# Patient Record
Sex: MALE | Race: OTHER | HISPANIC OR LATINO | Employment: FULL TIME | URBAN - METROPOLITAN AREA
[De-identification: names, ages, dates, MRNs, and addresses within clinical notes are randomized per-mention and may not be internally consistent; named-entity substitution may affect disease eponyms.]

---
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
## 2022-02-18 ENCOUNTER — APPOINTMENT (OUTPATIENT)
Dept: RADIOLOGY | Facility: MEDICAL CENTER | Age: 22
End: 2022-02-18
Attending: EMERGENCY MEDICINE
Payer: MEDICAID

## 2022-02-18 ENCOUNTER — HOSPITAL ENCOUNTER (EMERGENCY)
Facility: MEDICAL CENTER | Age: 22
End: 2022-02-18
Attending: EMERGENCY MEDICINE
Payer: MEDICAID

## 2022-02-18 VITALS
HEIGHT: 65 IN | RESPIRATION RATE: 20 BRPM | TEMPERATURE: 97.5 F | DIASTOLIC BLOOD PRESSURE: 55 MMHG | OXYGEN SATURATION: 98 % | BODY MASS INDEX: 19.16 KG/M2 | SYSTOLIC BLOOD PRESSURE: 101 MMHG | WEIGHT: 115 LBS | HEART RATE: 65 BPM

## 2022-02-18 DIAGNOSIS — Z56.6 STRESS AT WORK: ICD-10-CM

## 2022-02-18 DIAGNOSIS — E86.0 DEHYDRATION: ICD-10-CM

## 2022-02-18 DIAGNOSIS — R55 NEAR SYNCOPE: ICD-10-CM

## 2022-02-18 LAB
ALBUMIN SERPL BCP-MCNC: 4.9 G/DL (ref 3.2–4.9)
ALBUMIN/GLOB SERPL: 2.1 G/DL
ALP SERPL-CCNC: 126 U/L (ref 30–99)
ALT SERPL-CCNC: 13 U/L (ref 2–50)
ANION GAP SERPL CALC-SCNC: 18 MMOL/L (ref 7–16)
AST SERPL-CCNC: 26 U/L (ref 12–45)
BASOPHILS # BLD AUTO: 0.6 % (ref 0–1.8)
BASOPHILS # BLD: 0.05 K/UL (ref 0–0.12)
BILIRUB SERPL-MCNC: 0.8 MG/DL (ref 0.1–1.5)
BUN SERPL-MCNC: 13 MG/DL (ref 8–22)
CALCIUM SERPL-MCNC: 9.4 MG/DL (ref 8.5–10.5)
CHLORIDE SERPL-SCNC: 104 MMOL/L (ref 96–112)
CO2 SERPL-SCNC: 18 MMOL/L (ref 20–33)
CREAT SERPL-MCNC: 0.77 MG/DL (ref 0.5–1.4)
EKG IMPRESSION: NORMAL
EOSINOPHIL # BLD AUTO: 0.11 K/UL (ref 0–0.51)
EOSINOPHIL NFR BLD: 1.4 % (ref 0–6.9)
ERYTHROCYTE [DISTWIDTH] IN BLOOD BY AUTOMATED COUNT: 41.3 FL (ref 35.9–50)
GLOBULIN SER CALC-MCNC: 2.3 G/DL (ref 1.9–3.5)
GLUCOSE SERPL-MCNC: 93 MG/DL (ref 65–99)
HCT VFR BLD AUTO: 45.3 % (ref 42–52)
HGB BLD-MCNC: 15.7 G/DL (ref 14–18)
IMM GRANULOCYTES # BLD AUTO: 0.05 K/UL (ref 0–0.11)
IMM GRANULOCYTES NFR BLD AUTO: 0.6 % (ref 0–0.9)
LYMPHOCYTES # BLD AUTO: 1.99 K/UL (ref 1–4.8)
LYMPHOCYTES NFR BLD: 24.4 % (ref 22–41)
MAGNESIUM SERPL-MCNC: 2 MG/DL (ref 1.5–2.5)
MCH RBC QN AUTO: 30.3 PG (ref 27–33)
MCHC RBC AUTO-ENTMCNC: 34.7 G/DL (ref 33.7–35.3)
MCV RBC AUTO: 87.5 FL (ref 81.4–97.8)
MONOCYTES # BLD AUTO: 0.38 K/UL (ref 0–0.85)
MONOCYTES NFR BLD AUTO: 4.7 % (ref 0–13.4)
NEUTROPHILS # BLD AUTO: 5.56 K/UL (ref 1.82–7.42)
NEUTROPHILS NFR BLD: 68.3 % (ref 44–72)
NRBC # BLD AUTO: 0 K/UL
NRBC BLD-RTO: 0 /100 WBC
PLATELET # BLD AUTO: 242 K/UL (ref 164–446)
PMV BLD AUTO: 10.2 FL (ref 9–12.9)
POTASSIUM SERPL-SCNC: 3.7 MMOL/L (ref 3.6–5.5)
PROT SERPL-MCNC: 7.2 G/DL (ref 6–8.2)
RBC # BLD AUTO: 5.18 M/UL (ref 4.7–6.1)
SODIUM SERPL-SCNC: 140 MMOL/L (ref 135–145)
WBC # BLD AUTO: 8.1 K/UL (ref 4.8–10.8)

## 2022-02-18 PROCEDURE — 700111 HCHG RX REV CODE 636 W/ 250 OVERRIDE (IP): Performed by: EMERGENCY MEDICINE

## 2022-02-18 PROCEDURE — 71045 X-RAY EXAM CHEST 1 VIEW: CPT

## 2022-02-18 PROCEDURE — 96374 THER/PROPH/DIAG INJ IV PUSH: CPT

## 2022-02-18 PROCEDURE — 83735 ASSAY OF MAGNESIUM: CPT

## 2022-02-18 PROCEDURE — 80053 COMPREHEN METABOLIC PANEL: CPT

## 2022-02-18 PROCEDURE — 99284 EMERGENCY DEPT VISIT MOD MDM: CPT

## 2022-02-18 PROCEDURE — 93005 ELECTROCARDIOGRAM TRACING: CPT | Performed by: EMERGENCY MEDICINE

## 2022-02-18 PROCEDURE — 700105 HCHG RX REV CODE 258: Performed by: EMERGENCY MEDICINE

## 2022-02-18 PROCEDURE — 85025 COMPLETE CBC W/AUTO DIFF WBC: CPT

## 2022-02-18 RX ORDER — LORAZEPAM 2 MG/ML
0.5 INJECTION INTRAMUSCULAR ONCE
Status: COMPLETED | OUTPATIENT
Start: 2022-02-18 | End: 2022-02-18

## 2022-02-18 RX ORDER — SODIUM CHLORIDE, SODIUM LACTATE, POTASSIUM CHLORIDE, CALCIUM CHLORIDE 600; 310; 30; 20 MG/100ML; MG/100ML; MG/100ML; MG/100ML
1000 INJECTION, SOLUTION INTRAVENOUS ONCE
Status: COMPLETED | OUTPATIENT
Start: 2022-02-18 | End: 2022-02-18

## 2022-02-18 RX ADMIN — LORAZEPAM 0.5 MG: 2 INJECTION INTRAMUSCULAR; INTRAVENOUS at 20:11

## 2022-02-18 RX ADMIN — SODIUM CHLORIDE, POTASSIUM CHLORIDE, SODIUM LACTATE AND CALCIUM CHLORIDE 1000 ML: 600; 310; 30; 20 INJECTION, SOLUTION INTRAVENOUS at 20:10

## 2022-02-18 SDOH — HEALTH STABILITY - MENTAL HEALTH: OTHER PHYSICAL AND MENTAL STRAIN RELATED TO WORK: Z56.6

## 2022-02-19 NOTE — ED TRIAGE NOTES
Brought in by [facility] for near syncope. Got up off couch became nauseated, diaphoretic - when EMS arrived pt lying on floor pale and diaphoretic, BP 84/50. EMS initiated IV and gave 600 cc's of IVF's. /60 after IVF's.  Per EMS pt HR ranging from 40's - 80's.  .

## 2022-02-19 NOTE — DISCHARGE INSTRUCTIONS
You were seen and evaluated in the Emergency Department at [facility] for:     Almost passing out and generally not feeling well.    You had the following tests and studies:    Thankfully work-up today is reassuring we do not see anything dangerous like a bad heart rhythm or electrolyte abnormality.  Blood counts are normal.    You received the following medications:    IV fluids, lorazepam.    ----------------------------    Please make sure to follow up with:    Primary care provider next week for recheck and definitive care, return to the ER for any new or worsening symptoms.  ----------------------------    We always encourage patients to return IMMEDIATELY if they have:  Increased or changing pain, passing out, fevers over 100.4 (taken in your mouth or rectally) for more than 2 days, redness or swelling of skin or tissues, feeling like your heart is beating fast, chest pain that is new or worsening, trouble breathing, feeling like your throat is closing up and can not breath, inability to walk, weakness of any part of your body, new dizziness, severe bleeding that won't stop from any part of your body, if you can't eat or drink, or if you have any other concerns.   If you feel worse, please know that you can always return with any questions, concerns, worse symptoms, or you are feeling unsafe. We certainly cannot say for sure that we have ruled out every illness or dangerous disease, but we feel that at this specific time, your exam, tests, and vital signs like heart rate and blood pressure are safe for discharge.

## 2022-02-19 NOTE — ED PROVIDER NOTES
"ED Provider Note    CHIEF COMPLAINT  Chief Complaint   Patient presents with   • Near Syncopal       HPI    Primary care provider: Cody Camejo M.D.  Means of arrival: EMS  History obtained from: Patient, medic report  History limited by: Nothing    Klaus Scott is a 21 y.o. male who presents with near syncope.  Onset just prior to arrival.  Apparently the patient got up from the couch she was walking to go have several sips of water and then he became near syncopal and developed nausea and diaphoresis.  He has had mild episodes like this before, never this bad.  He never fully lost consciousness, did not fall to the ground or injure himself.  He does report highly amounts of increased financial stress lately due to stress at work, he works at [employer] currently.  He denies any thoughts of suicidal ideation or attempts at self-harm.  Occasional cannabis use denies any today, no alcohol abuse.  Takes no daily medications.  Currently feeling better without intervention, transported here with a normal blood glucose for EMS no interventions taken prior to arrival.  He denies any other recent illness or medical complaints.  He reports when this episode occurred he felt very anxious, and his hands \"locked up\".  That has been improving over the last half hour or so.    REVIEW OF SYSTEMS  Constitutional: Negative for fever or chills.   HENT: Negative for headache or rhinorrhea or sore throat.  Respiratory: Negative for cough or shortness of breath.    Cardiovascular: Negative for chest pain but positive for near syncope.  Gastrointestinal: Positive for resolved nausea, negative for vomiting or abdominal pain.  Genitourinary: Negative for dysuria or flank pain.   Musculoskeletal: Negative for back pain or joint pain.   Skin: Negative for itching or rash.  Positive for an episode of diaphoresis.  Neurological: Negative for sensory or motor changes.   Psychiatric/Behavioral: Positive for stress and anxiousness, " "negative for thoughts or attempts at self-harm.  See HPI for further details. All other systems are negative.     PAST MEDICAL HISTORY   has a past medical history of Fracture of arm.    PAST FAMILY HISTORY  Family History   Problem Relation Age of Onset   • Diabetes Mother    • Diabetes Father    • Hypertension Father    • Heart Attack Father    • Other Brother         nephrotic syndrome   • Psychiatric Illness Neg Hx    • Seizures Neg Hx        SOCIAL HISTORY  Social History     Tobacco Use   • Smoking status: Never Smoker   • Smokeless tobacco: Never Used   Vaping Use   • Vaping Use: Every day   • Substances: Nicotine, THC   Substance and Sexual Activity   • Alcohol use: No   • Drug use: Yes     Comment: marijuana   • Sexual activity: Not on file       SURGICAL HISTORY  patient denies any surgical history    CURRENT MEDICATIONS  Home Medications     Reviewed by Emily Collazo R.N. (Registered Nurse) on 02/18/22 at 1918  Med List Status: <None>   Medication Last Dose Status   cephALEXin (KEFLEX) 500 MG Cap  Active   lidocaine (LIDODERM) 5 % Patch  Active                ALLERGIES  No Known Allergies    PHYSICAL EXAM  VITAL SIGNS: /55   Pulse 65   Temp 36.4 °C (97.5 °F) (Temporal)   Resp 20   Ht 1.651 m (5' 5\")   Wt 52.2 kg (115 lb)   SpO2 98%   BMI 19.14 kg/m²    Pulse ox interpretation: On room air, I interpret this pulse ox as normal.  Constitutional: Well-developed, well-nourished. Sitting up.   HEENT: Normocephalic, atraumatic. Posterior pharynx clear, mucous membranes dry.  Eyes:  EOMI. Normal sclerae.  Neck: Supple, nontender.  Chest/Pulmonary: Clear to ausculation bilaterally, no wheezes or rhonchi.  Cardiovascular: Regular rate and rhythm, no murmur.   Abdomen: Soft, nontender; no rebound, guarding, or masses.  Back: No CVA or midline tenderness.   Musculoskeletal: No deformity or edema.  Neuro: Clear speech, normal coordination, cranial nerves II-XII grossly intact, no focal asymmetry or " sensory deficits.   Psych: Anxious appearing but very pleasant and cooperative.  Skin: No rashes, warm and dry.      DIAGNOSTIC STUDIES / PROCEDURES    LABS & EKG  Results for orders placed or performed during the hospital encounter of 02/18/22   CBC WITH DIFFERENTIAL   Result Value Ref Range    WBC 8.1 4.8 - 10.8 K/uL    RBC 5.18 4.70 - 6.10 M/uL    Hemoglobin 15.7 14.0 - 18.0 g/dL    Hematocrit 45.3 42.0 - 52.0 %    MCV 87.5 81.4 - 97.8 fL    MCH 30.3 27.0 - 33.0 pg    MCHC 34.7 33.7 - 35.3 g/dL    RDW 41.3 35.9 - 50.0 fL    Platelet Count 242 164 - 446 K/uL    MPV 10.2 9.0 - 12.9 fL    Neutrophils-Polys 68.30 44.00 - 72.00 %    Lymphocytes 24.40 22.00 - 41.00 %    Monocytes 4.70 0.00 - 13.40 %    Eosinophils 1.40 0.00 - 6.90 %    Basophils 0.60 0.00 - 1.80 %    Immature Granulocytes 0.60 0.00 - 0.90 %    Nucleated RBC 0.00 /100 WBC    Neutrophils (Absolute) 5.56 1.82 - 7.42 K/uL    Lymphs (Absolute) 1.99 1.00 - 4.80 K/uL    Monos (Absolute) 0.38 0.00 - 0.85 K/uL    Eos (Absolute) 0.11 0.00 - 0.51 K/uL    Baso (Absolute) 0.05 0.00 - 0.12 K/uL    Immature Granulocytes (abs) 0.05 0.00 - 0.11 K/uL    NRBC (Absolute) 0.00 K/uL   CMP   Result Value Ref Range    Sodium 140 135 - 145 mmol/L    Potassium 3.7 3.6 - 5.5 mmol/L    Chloride 104 96 - 112 mmol/L    Co2 18 (L) 20 - 33 mmol/L    Anion Gap 18.0 (H) 7.0 - 16.0    Glucose 93 65 - 99 mg/dL    Bun 13 8 - 22 mg/dL    Creatinine 0.77 0.50 - 1.40 mg/dL    Calcium 9.4 8.5 - 10.5 mg/dL    AST(SGOT) 26 12 - 45 U/L    ALT(SGPT) 13 2 - 50 U/L    Alkaline Phosphatase 126 (H) 30 - 99 U/L    Total Bilirubin 0.8 0.1 - 1.5 mg/dL    Albumin 4.9 3.2 - 4.9 g/dL    Total Protein 7.2 6.0 - 8.2 g/dL    Globulin 2.3 1.9 - 3.5 g/dL    A-G Ratio 2.1 g/dL   MAGNESIUM   Result Value Ref Range    Magnesium 2.0 1.5 - 2.5 mg/dL   ESTIMATED GFR   Result Value Ref Range    GFR If African American >60 >60 mL/min/1.73 m 2    GFR If Non African American >60 >60 mL/min/1.73 m 2   EKG   Result Value  Ref Range    Report       [facility] Emergency Dept.    Test Date:  2022  Pt Name:    JIM HILTON           Department: ER  MRN:        [id number]                      Room:       ORTHO  Gender:     Male                         Technician: [id number]  :        2000                   Requested By:ER TRIAGE PROTOCOL  Order #:    [id number]                    Reading MD: Jorden Gaviria MD    Measurements  Intervals                                Axis  Rate:       49                           P:          49  OH:         125                          QRS:        79  QRSD:       106                          T:          54  QT:         479  QTc:        433    Interpretive Statements  Sinus bradycardia  Atrial premature complex  No previous ECG available for comparison  No STEMI, strain, or dysrhythmia  Electronically Signed On 2022 20:09:26 PST by Jorden Gaviria MD         RADIOLOGY  DX-CHEST-PORTABLE (1 VIEW)   Final Result      No radiographic evidence of acute cardiopulmonary process.          COURSE & MEDICAL DECISION MAKING    This is a 21 y.o. male who presents with near syncope after standing.    Differential Diagnosis includes but is not limited to:  Vagal event, dysrhythmia, electrolyte abnormality, dehydration, stress/anxiety    ED Course:  Otherwise healthy 21-year-old male with above concerning presentation.  Plan EKG chest x-ray labs.  I will keep n.p.o. until surgical process ruled out, looks dry I will treat with crystalloid fluid bolus.  He is describing carpopedal spasms and increase stress and anxiousness lately, possible panic attack so I will also treat with a low dose of parenteral lorazepam.  Patient and girlfriend understand and agree with plan of care.    Thankfully work-up today reassuring nothing acute, patient is stable vital signs no recurrent symptoms feels much better after meds and fluids.  Plan discharge, follow-up with primary care provider, return  immediately if any worse.    Medications   lactated ringers (LR) bolus (0 mL Intravenous Stopped 2/18/22 2102)   LORazepam (ATIVAN) injection 0.5 mg (0.5 mg Intravenous Given 2/18/22 2011)       FINAL IMPRESSION  1. Near syncope    2. Dehydration    3. Stress at work        PRESCRIPTIONS  Discharge Medication List as of 2/18/2022  9:13 PM          FOLLOW UP  Cody Camejo M.D.  [address] [facility] [id number] [phone]    Schedule an appointment as soon as possible for a visit in 3 days  for recheck and routine care    [facility], Emergency Dept  [address] [facility] [facility] [address] [phone]  Today  If you have ANY new or worse symptoms!      -DISCHARGE-       Results, exam findings, clinical impression, presumed diagnosis, treatment options, and strict return precautions were discussed with the patient and girlfriend, and they verbalized understanding, agreed with, and appreciated the plan of care.    Pertinent Labs & Imaging studies reviewed and verified by myself, as well as nursing notes and the patient's past medical, family, and social histories (See chart for details).    The patient is referred to a primary physician for blood pressure management, diabetic screening, and for all other preventative health concerns.     Portions of this record were made with voice recognition software.  Despite my review, spelling/grammar/context errors may still remain.  Interpretation of this chart should be taken in this context.    Electronically signed by Jorden Gaviria M.D. on 2/23/2022 at 3:59 PM.

## 2022-06-26 ENCOUNTER — APPOINTMENT (OUTPATIENT)
Dept: RADIOLOGY | Facility: MEDICAL CENTER | Age: 22
End: 2022-06-26
Attending: EMERGENCY MEDICINE
Payer: COMMERCIAL

## 2022-06-26 ENCOUNTER — HOSPITAL ENCOUNTER (EMERGENCY)
Facility: MEDICAL CENTER | Age: 22
End: 2022-06-26
Attending: EMERGENCY MEDICINE
Payer: COMMERCIAL

## 2022-06-26 VITALS
RESPIRATION RATE: 16 BRPM | DIASTOLIC BLOOD PRESSURE: 80 MMHG | BODY MASS INDEX: 19.04 KG/M2 | WEIGHT: 111.55 LBS | TEMPERATURE: 98.2 F | HEIGHT: 64 IN | OXYGEN SATURATION: 97 % | HEART RATE: 51 BPM | SYSTOLIC BLOOD PRESSURE: 119 MMHG

## 2022-06-26 DIAGNOSIS — S91.209A AVULSION OF TOENAIL, INITIAL ENCOUNTER: ICD-10-CM

## 2022-06-26 PROCEDURE — 11730 AVULSION NAIL PLATE SIMPLE 1: CPT

## 2022-06-26 PROCEDURE — 99283 EMERGENCY DEPT VISIT LOW MDM: CPT

## 2022-06-26 PROCEDURE — 73660 X-RAY EXAM OF TOE(S): CPT | Mod: LT

## 2022-06-26 ASSESSMENT — FIBROSIS 4 INDEX: FIB4 SCORE: 0.6

## 2022-06-26 NOTE — ED NOTES
1545 Dressing applied  1552 D/C instn reviewed BID cleanse and dress Return for s/s infection Otherwise F/U with [facility] D/C ambul Stable improved condn
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
## 2022-06-26 NOTE — DISCHARGE INSTRUCTIONS
I anticipate this toenail will grow back again.  Use antifungal over-the-counter cream twice a day at the base as this is starting to grow to help prevent recurrence of nail thickening/nail fungus    Will be released to work with sitdown job only for the next 2 days, then ambulates to continue regular job.  Otherwise follow-up with Workmen's Comp. clinic
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
